# Patient Record
Sex: FEMALE | Race: OTHER | Employment: FULL TIME | URBAN - METROPOLITAN AREA
[De-identification: names, ages, dates, MRNs, and addresses within clinical notes are randomized per-mention and may not be internally consistent; named-entity substitution may affect disease eponyms.]

---

## 2019-01-25 ENCOUNTER — OFFICE VISIT (OUTPATIENT)
Dept: ENT CLINIC | Age: 49
End: 2019-01-25
Payer: COMMERCIAL

## 2019-01-25 VITALS — OXYGEN SATURATION: 98 % | SYSTOLIC BLOOD PRESSURE: 126 MMHG | DIASTOLIC BLOOD PRESSURE: 82 MMHG | HEART RATE: 67 BPM

## 2019-01-25 DIAGNOSIS — J30.9 ALLERGIC SINUSITIS: Primary | ICD-10-CM

## 2019-01-25 PROCEDURE — 99203 OFFICE O/P NEW LOW 30 MIN: CPT | Performed by: OTOLARYNGOLOGY

## 2019-01-25 PROCEDURE — 96372 THER/PROPH/DIAG INJ SC/IM: CPT | Performed by: OTOLARYNGOLOGY

## 2019-01-25 PROCEDURE — 1036F TOBACCO NON-USER: CPT | Performed by: OTOLARYNGOLOGY

## 2019-01-25 RX ORDER — METHYLPREDNISOLONE ACETATE 40 MG/ML
40 INJECTION, SUSPENSION INTRA-ARTICULAR; INTRALESIONAL; INTRAMUSCULAR; SOFT TISSUE ONCE
Status: COMPLETED | OUTPATIENT
Start: 2019-01-25 | End: 2019-01-25

## 2019-01-25 RX ORDER — MONTELUKAST SODIUM 10 MG/1
10 TABLET ORAL NIGHTLY
Qty: 30 TABLET | Refills: 1 | Status: SHIPPED | OUTPATIENT
Start: 2019-01-25 | End: 2019-04-01 | Stop reason: SDUPTHER

## 2019-01-25 RX ADMIN — METHYLPREDNISOLONE ACETATE 40 MG: 40 INJECTION, SUSPENSION INTRA-ARTICULAR; INTRALESIONAL; INTRAMUSCULAR; SOFT TISSUE at 12:08

## 2019-01-25 ASSESSMENT — ENCOUNTER SYMPTOMS
SINUS PAIN: 1
RHINORRHEA: 1
SINUS PRESSURE: 1
FACIAL SWELLING: 0
ALLERGIC/IMMUNOLOGIC NEGATIVE: 1
SORE THROAT: 0
VOICE CHANGE: 0
EYES NEGATIVE: 1
RESPIRATORY NEGATIVE: 1
TROUBLE SWALLOWING: 0

## 2019-04-01 DIAGNOSIS — J30.9 ALLERGIC SINUSITIS: ICD-10-CM

## 2019-04-01 RX ORDER — MONTELUKAST SODIUM 10 MG/1
TABLET ORAL
Qty: 30 TABLET | Refills: 0 | Status: SHIPPED | OUTPATIENT
Start: 2019-04-01 | End: 2019-04-29

## 2019-04-29 ENCOUNTER — OFFICE VISIT (OUTPATIENT)
Dept: INTERNAL MEDICINE CLINIC | Age: 49
End: 2019-04-29
Payer: COMMERCIAL

## 2019-04-29 VITALS
OXYGEN SATURATION: 98 % | DIASTOLIC BLOOD PRESSURE: 71 MMHG | WEIGHT: 167 LBS | BODY MASS INDEX: 26.21 KG/M2 | RESPIRATION RATE: 20 BRPM | HEIGHT: 67 IN | HEART RATE: 64 BPM | SYSTOLIC BLOOD PRESSURE: 104 MMHG | TEMPERATURE: 98.3 F

## 2019-04-29 DIAGNOSIS — J30.2 SEASONAL ALLERGIES: ICD-10-CM

## 2019-04-29 DIAGNOSIS — Z76.89 ESTABLISHING CARE WITH NEW DOCTOR, ENCOUNTER FOR: ICD-10-CM

## 2019-04-29 DIAGNOSIS — Z76.89 ESTABLISHING CARE WITH NEW DOCTOR, ENCOUNTER FOR: Primary | ICD-10-CM

## 2019-04-29 LAB
A/G RATIO: 1.5 (ref 1.1–2.2)
ALBUMIN SERPL-MCNC: 4.6 G/DL (ref 3.4–5)
ALP BLD-CCNC: 54 U/L (ref 40–129)
ALT SERPL-CCNC: 15 U/L (ref 10–40)
ANION GAP SERPL CALCULATED.3IONS-SCNC: 13 MMOL/L (ref 3–16)
AST SERPL-CCNC: 21 U/L (ref 15–37)
BASOPHILS ABSOLUTE: 0 K/UL (ref 0–0.2)
BASOPHILS RELATIVE PERCENT: 1.1 %
BILIRUB SERPL-MCNC: <0.2 MG/DL (ref 0–1)
BUN BLDV-MCNC: 14 MG/DL (ref 7–20)
CALCIUM SERPL-MCNC: 9.6 MG/DL (ref 8.3–10.6)
CHLORIDE BLD-SCNC: 102 MMOL/L (ref 99–110)
CHOLESTEROL, FASTING: 180 MG/DL (ref 0–199)
CO2: 26 MMOL/L (ref 21–32)
CREAT SERPL-MCNC: 0.6 MG/DL (ref 0.6–1.1)
EOSINOPHILS ABSOLUTE: 0 K/UL (ref 0–0.6)
EOSINOPHILS RELATIVE PERCENT: 1 %
GFR AFRICAN AMERICAN: >60
GFR NON-AFRICAN AMERICAN: >60
GLOBULIN: 3.1 G/DL
GLUCOSE BLD-MCNC: 99 MG/DL (ref 70–99)
HCT VFR BLD CALC: 40.5 % (ref 36–48)
HDLC SERPL-MCNC: 88 MG/DL (ref 40–60)
HEMOGLOBIN: 13.2 G/DL (ref 12–16)
LDL CHOLESTEROL CALCULATED: 85 MG/DL
LYMPHOCYTES ABSOLUTE: 1.5 K/UL (ref 1–5.1)
LYMPHOCYTES RELATIVE PERCENT: 41.4 %
MCH RBC QN AUTO: 30.7 PG (ref 26–34)
MCHC RBC AUTO-ENTMCNC: 32.5 G/DL (ref 31–36)
MCV RBC AUTO: 94.6 FL (ref 80–100)
MONOCYTES ABSOLUTE: 0.3 K/UL (ref 0–1.3)
MONOCYTES RELATIVE PERCENT: 9 %
NEUTROPHILS ABSOLUTE: 1.7 K/UL (ref 1.7–7.7)
NEUTROPHILS RELATIVE PERCENT: 47.5 %
PDW BLD-RTO: 13.3 % (ref 12.4–15.4)
PLATELET # BLD: 219 K/UL (ref 135–450)
PMV BLD AUTO: 9.1 FL (ref 5–10.5)
POTASSIUM SERPL-SCNC: 4.4 MMOL/L (ref 3.5–5.1)
RBC # BLD: 4.29 M/UL (ref 4–5.2)
SODIUM BLD-SCNC: 141 MMOL/L (ref 136–145)
TOTAL PROTEIN: 7.7 G/DL (ref 6.4–8.2)
TRIGLYCERIDE, FASTING: 37 MG/DL (ref 0–150)
VLDLC SERPL CALC-MCNC: 7 MG/DL
WBC # BLD: 3.6 K/UL (ref 4–11)

## 2019-04-29 PROCEDURE — 99213 OFFICE O/P EST LOW 20 MIN: CPT | Performed by: INTERNAL MEDICINE

## 2019-04-29 NOTE — PROGRESS NOTES
[address] [facility]       NURSING PROGRESS NOTE      April 29, 2019  [facility]    Chief Complaint:   Chief Complaint   Patient presents with    Establish Care       Constitutional: negative  Eyes: positive for contacts/glasses  Ears, nose, mouth, throat, and face: Gets thrush in mouth  Respiratory: negative, sinus issues  Cardiovascular: negative  Gastrointestinal: negative, lost 110 lbs over 3 years  Genitourinary: negative  Integument/breast: negative  Hematologic/lymphatic: negative  Musculoskeletal: negative  Neurological: negative  Diabetes: No    Pain Assessment:  Pain Present: no      Mobility/Safety/Self-Care:  Steady Gait: Yes  History of Falls: No   History of a Fall within the last 30 days No  Assistive Device: None  Poor Hygiene: No    Psychosocial:   Depression: No  If YES,    Does Patient express current thoughts of harming self or others?: No  Anxious: Yes  Insomnia: No  Inappropriate Behavior: No  Alcohol Abuse: y.  Stopped 7 years ago  Substance Abuse: no  Signs of Physical Abuse: No  Signs of Emotional Abuse: No    Educational:  Identify the learner who is being assessed for education:  patient                    Ability to Learn:  Exhibits ability to grasp concepts and respond to questions: Medium  Ready to Learn: Yes  calm   Preferred Method of Learning:  written  Barriers to Learning: Verbalizes interest  Special Considerations due to cultural, Gnosticism, spiritual beliefs:  No  Language:  English  :  No    Note:   Had complete hysterectomy 5 years ago for fibroid      9:31 AM 4/29/2019

## 2019-04-29 NOTE — PROGRESS NOTES
OUTPATIENT PROGRESS NOTE    Date of Service:  4/29/2019  Address: [address] [place] [address] [address] [place] [id number]  Dept: [phone]  Loc: [phone]    Subjective:      Patient [id number]  Addi Liao is a 52 y.o. female with:  Chief Complaint   Patient presents with    Establish Care       HPI: 51 yo female with a history of ETOH abuse, sober for 7 years, being seen to establish care. She reports chronic upper respiratory symptoms that has been treated over the years with courses of antibiotics and steroids, with little persistent relief. No cough, no shortness of breath, no chest pain, no unexplained weight loss, no fevers or chills, no night sweats. She has recurrent episodes of what she is told is thrush, has had them since she was a child. Recently diagnosed with this again by an urgent care. She is treating it with diet modifications she read about online. She is not sexually active, has not been so for about 10 years. She has one child. Family history of HTN, DM2. Her mother is a 27 year survivor of breast and uterine cancer. Patient had a total hysterectomy about 5 years ago. No vaginal bleeding; occasional vasomotor symptoms. No blood per rectum. She has not had a colonoscopy nor has she had a mammogram.    Review of Systems  As per HPI otherwise negative on 12 point review    Objective: Wt Readings from Last 3 Encounters:   04/29/19 167 lb (75.8 kg)     BP Readings from Last 3 Encounters:   04/29/19 104/71   01/25/19 126/82      Vitals:    04/29/19 0929   BP: 104/71   Site: Right Upper Arm   Position: Sitting   Cuff Size: Medium Adult   Pulse: 64   Resp: 20   Temp: 98.3 °F (36.8 °C)   SpO2: 98%   Weight: 167 lb (75.8 kg)   Height: 5' 7\" (1.702 m)     Body mass index is 26.16 kg/m². Physical Exam   Constitutional: She is oriented to person, place, and time. She appears well-developed and well-nourished. No distress.    HENT: Head: Normocephalic and atraumatic. Mouth/Throat: Oropharynx is clear and moist.   Eyes: EOM are normal. No scleral icterus. Neck: Normal range of motion. Neck supple. No JVD present. No tracheal deviation present. No thyromegaly present. Cardiovascular: Normal rate and regular rhythm. Pulmonary/Chest: Effort normal and breath sounds normal.   Abdominal: Soft. Bowel sounds are normal. She exhibits no distension. There is no tenderness. Musculoskeletal: Normal range of motion. She exhibits no edema. Lymphadenopathy:     She has no cervical adenopathy. Neurological: She is alert and oriented to person, place, and time. Grossly nonfocal   Skin: Skin is warm and dry. Capillary refill takes less than 2 seconds. She is not diaphoretic. No erythema. Psychiatric: She has a normal mood and affect. Her behavior is normal. Judgment and thought content normal.   Vitals reviewed. Assessment/Plan:      Encounter Diagnoses   Name Primary?  Establishing care with new doctor, encounter for Yes    Seasonal allergies        1. Establishing care with new doctor, encounter for  - CBC WITH AUTO DIFFERENTIAL; Future  - Lipid, Fasting; Future  - COMPREHENSIVE METABOLIC PANEL; Future  - TSH with Reflex; Future  - HEMOGLOBIN A1C; Future  - discussed need for mammograms and colonoscopy screening exams in the near future  - discussed recommendation of one time HIV screening test, as patient is low risk for HIV - she does not want to pursue this testing at this time    2.  Seasonal allergies  -encouraged to try over the counter fexonfenadine; risks benefits alternatives discussed       Staffed with Dr. Vivi Burns    Additional Orders:      Orders Placed This Encounter   Procedures    CBC WITH AUTO DIFFERENTIAL     Standing Status:   Future     Standing Expiration Date:   4/29/2020    Lipid, Fasting     Standing Status:   Future     Standing Expiration Date:   4/29/2020    COMPREHENSIVE METABOLIC PANEL     Standing Status:   Future     Standing Expiration Date:   4/29/2020    TSH with Reflex     Standing Status:   Future     Standing Expiration Date:   4/29/2020    HEMOGLOBIN A1C     Standing Status:   Future     Standing Expiration Date:   4/29/2020     No orders of the defined types were placed in this encounter. DISPOSITION:      1. Return in about 3 months (around 7/29/2019).        Jordon Camacho MD   pgy 3

## 2019-04-29 NOTE — PATIENT INSTRUCTIONS
Before any of you medication is completely gone, call your pharmacy AT LEAST 1 WEEK ahead for refills. Review all information regarding your medication before starting. If you become ill when the clinic is closed, please call the [facility] [employer] [facility]  at   #[phone] and ask the  to page the AOD between 6:00 AM and 6:00 PM or page the AON between 6:00 PM and 6:00 am    The clinic is not able to process MY CHART requests for appointments or messages including requests. Please call the [address] [place] [address] at [phone]  For appointments and messages. Call your pharmacy for medication refills. Return to clinic 3 months.   Call after  July 1st for appointment  Labwork ordered to be done anytime  Allegra over the counter to take daily for seasonal allergies    Continue medication as listed on discharge sheet    Instructions reviewed before discharge and copy given to patient    Tommie Una Castellano [phone]

## 2019-04-30 LAB
ESTIMATED AVERAGE GLUCOSE: 125.5 MG/DL
HBA1C MFR BLD: 6 %
TSH REFLEX: 1.11 UIU/ML (ref 0.27–4.2)

## 2019-05-06 DIAGNOSIS — J30.9 ALLERGIC SINUSITIS: ICD-10-CM

## 2019-05-06 RX ORDER — MONTELUKAST SODIUM 10 MG/1
TABLET ORAL
Qty: 30 TABLET | Refills: 0 | Status: SHIPPED | OUTPATIENT
Start: 2019-05-06 | End: 2019-06-09 | Stop reason: SDUPTHER

## 2019-06-09 DIAGNOSIS — J30.9 ALLERGIC SINUSITIS: ICD-10-CM

## 2019-06-10 RX ORDER — MONTELUKAST SODIUM 10 MG/1
TABLET ORAL
Qty: 30 TABLET | Refills: 0 | Status: SHIPPED | OUTPATIENT
Start: 2019-06-10 | End: 2019-07-15 | Stop reason: SDUPTHER

## 2019-07-14 DIAGNOSIS — J30.9 ALLERGIC SINUSITIS: ICD-10-CM

## 2019-07-15 RX ORDER — MONTELUKAST SODIUM 10 MG/1
TABLET ORAL
Qty: 30 TABLET | Refills: 0 | Status: SHIPPED | OUTPATIENT
Start: 2019-07-15 | End: 2019-08-17 | Stop reason: SDUPTHER

## 2019-08-17 DIAGNOSIS — J30.9 ALLERGIC SINUSITIS: ICD-10-CM

## 2019-08-19 RX ORDER — MONTELUKAST SODIUM 10 MG/1
TABLET ORAL
Qty: 30 TABLET | Refills: 0 | Status: SHIPPED | OUTPATIENT
Start: 2019-08-19 | End: 2019-09-20 | Stop reason: SDUPTHER

## 2019-09-20 DIAGNOSIS — J30.9 ALLERGIC SINUSITIS: ICD-10-CM

## 2019-09-20 RX ORDER — MONTELUKAST SODIUM 10 MG/1
TABLET ORAL
Qty: 30 TABLET | Refills: 0 | Status: SHIPPED | OUTPATIENT
Start: 2019-09-20 | End: 2019-10-30 | Stop reason: SDUPTHER

## 2019-10-22 ENCOUNTER — OFFICE VISIT (OUTPATIENT)
Dept: INTERNAL MEDICINE CLINIC | Age: 49
End: 2019-10-22
Payer: COMMERCIAL

## 2019-10-22 VITALS
HEIGHT: 67 IN | BODY MASS INDEX: 27.31 KG/M2 | WEIGHT: 174 LBS | SYSTOLIC BLOOD PRESSURE: 126 MMHG | TEMPERATURE: 98.8 F | OXYGEN SATURATION: 98 % | DIASTOLIC BLOOD PRESSURE: 84 MMHG | HEART RATE: 59 BPM

## 2019-10-22 DIAGNOSIS — F10.11 HISTORY OF ALCOHOL ABUSE: ICD-10-CM

## 2019-10-22 DIAGNOSIS — J30.9 ALLERGIC SINUSITIS: ICD-10-CM

## 2019-10-22 DIAGNOSIS — R73.03 PREDIABETES: Primary | ICD-10-CM

## 2019-10-22 PROCEDURE — 99213 OFFICE O/P EST LOW 20 MIN: CPT | Performed by: STUDENT IN AN ORGANIZED HEALTH CARE EDUCATION/TRAINING PROGRAM

## 2019-10-22 ASSESSMENT — PATIENT HEALTH QUESTIONNAIRE - PHQ9
SUM OF ALL RESPONSES TO PHQ9 QUESTIONS 1 & 2: 0
SUM OF ALL RESPONSES TO PHQ QUESTIONS 1-9: 0
2. FEELING DOWN, DEPRESSED OR HOPELESS: 0
1. LITTLE INTEREST OR PLEASURE IN DOING THINGS: 0
SUM OF ALL RESPONSES TO PHQ QUESTIONS 1-9: 0

## 2019-10-22 ASSESSMENT — ENCOUNTER SYMPTOMS
SHORTNESS OF BREATH: 0
VOICE CHANGE: 0
CHEST TIGHTNESS: 0
CONSTIPATION: 0
ABDOMINAL PAIN: 0
DIARRHEA: 0
RHINORRHEA: 0

## 2019-10-30 DIAGNOSIS — J30.9 ALLERGIC SINUSITIS: ICD-10-CM

## 2019-10-30 RX ORDER — MONTELUKAST SODIUM 10 MG/1
TABLET ORAL
Qty: 30 TABLET | Refills: 0 | Status: SHIPPED | OUTPATIENT
Start: 2019-10-30 | End: 2019-12-04 | Stop reason: SDUPTHER

## 2019-12-04 DIAGNOSIS — J30.9 ALLERGIC SINUSITIS: ICD-10-CM

## 2019-12-04 RX ORDER — MONTELUKAST SODIUM 10 MG/1
TABLET ORAL
Qty: 30 TABLET | Refills: 0 | Status: SHIPPED | OUTPATIENT
Start: 2019-12-04 | End: 2020-01-10

## 2020-01-10 RX ORDER — MONTELUKAST SODIUM 10 MG/1
TABLET ORAL
Qty: 30 TABLET | Refills: 0 | Status: SHIPPED | OUTPATIENT
Start: 2020-01-10 | End: 2020-02-10

## 2020-02-10 RX ORDER — MONTELUKAST SODIUM 10 MG/1
TABLET ORAL
Qty: 30 TABLET | Refills: 0 | Status: SHIPPED | OUTPATIENT
Start: 2020-02-10

## 2020-07-18 ENCOUNTER — HOSPITAL ENCOUNTER (EMERGENCY)
Age: 50
Discharge: HOME | End: 2020-07-18
Attending: EMERGENCY MEDICINE

## 2020-07-18 VITALS
BODY MASS INDEX: 25.84 KG/M2 | OXYGEN SATURATION: 99 % | WEIGHT: 165 LBS | DIASTOLIC BLOOD PRESSURE: 75 MMHG | SYSTOLIC BLOOD PRESSURE: 122 MMHG | RESPIRATION RATE: 15 BRPM | TEMPERATURE: 98.4 F | HEART RATE: 62 BPM

## 2020-07-18 PROCEDURE — 99283 EMERGENCY DEPT VISIT LOW MDM: CPT

## 2020-07-18 PROCEDURE — 6370000000 HC RX 637 (ALT 250 FOR IP): Performed by: EMERGENCY MEDICINE

## 2020-07-18 RX ORDER — IBUPROFEN 600 MG/1
600 TABLET ORAL ONCE
Status: COMPLETED | OUTPATIENT
Start: 2020-07-18 | End: 2020-07-18

## 2020-07-18 RX ADMIN — IBUPROFEN 600 MG: 600 TABLET ORAL at 14:09

## 2020-07-18 ASSESSMENT — PAIN DESCRIPTION - ORIENTATION: ORIENTATION: MID

## 2020-07-18 ASSESSMENT — PAIN DESCRIPTION - LOCATION: LOCATION: BACK

## 2020-07-18 ASSESSMENT — PAIN DESCRIPTION - PAIN TYPE: TYPE: ACUTE PAIN

## 2020-07-18 ASSESSMENT — PAIN SCALES - GENERAL: PAINLEVEL_OUTOF10: 4
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
## 2020-10-02 ENCOUNTER — OFFICE VISIT (OUTPATIENT)
Dept: INTERNAL MEDICINE CLINIC | Age: 50
End: 2020-10-02
Payer: COMMERCIAL

## 2020-10-02 VITALS
WEIGHT: 177.6 LBS | DIASTOLIC BLOOD PRESSURE: 70 MMHG | OXYGEN SATURATION: 99 % | TEMPERATURE: 96.6 F | BODY MASS INDEX: 27.82 KG/M2 | RESPIRATION RATE: 16 BRPM | SYSTOLIC BLOOD PRESSURE: 110 MMHG | HEART RATE: 77 BPM

## 2020-10-02 DIAGNOSIS — R73.03 PRE-DIABETES: ICD-10-CM

## 2020-10-02 PROCEDURE — 99213 OFFICE O/P EST LOW 20 MIN: CPT | Performed by: STUDENT IN AN ORGANIZED HEALTH CARE EDUCATION/TRAINING PROGRAM

## 2020-10-02 ASSESSMENT — ENCOUNTER SYMPTOMS
EYE DISCHARGE: 0
CHOKING: 0
BACK PAIN: 0
EYE REDNESS: 0
ANAL BLEEDING: 0
BLOOD IN STOOL: 0
CHEST TIGHTNESS: 0
EYE ITCHING: 0
COUGH: 0
PHOTOPHOBIA: 0
APNEA: 0
SHORTNESS OF BREATH: 0
CONSTIPATION: 0
ABDOMINAL DISTENTION: 0
ABDOMINAL PAIN: 0
EYE PAIN: 0

## 2020-10-02 ASSESSMENT — PATIENT HEALTH QUESTIONNAIRE - PHQ9
SUM OF ALL RESPONSES TO PHQ9 QUESTIONS 1 & 2: 0
1. LITTLE INTEREST OR PLEASURE IN DOING THINGS: 0
2. FEELING DOWN, DEPRESSED OR HOPELESS: 0
SUM OF ALL RESPONSES TO PHQ QUESTIONS 1-9: 0
SUM OF ALL RESPONSES TO PHQ QUESTIONS 1-9: 0

## 2020-10-02 NOTE — PROGRESS NOTES
10/2/2020     Alisia Boeck (:  1970) is a 48 y.o. female, here for evaluation of the following medical concerns:    HPI  Follow up visit:  Pt denies any Complains, she was asking for flu shot, Sh is felling well, she does want more time to think and need some time,     Pt had hx of alcohol abuse, she is Sober more than 5 Years. Review of Systems   Constitutional: Negative for activity change, appetite change, chills, diaphoresis and fatigue. HENT: Negative for congestion, dental problem, drooling and ear discharge. Eyes: Negative for photophobia, pain, discharge, redness and itching. Respiratory: Negative for apnea, cough, choking, chest tightness and shortness of breath. Cardiovascular: Negative for chest pain, palpitations and leg swelling. Gastrointestinal: Negative for abdominal distention, abdominal pain, anal bleeding, blood in stool and constipation. Endocrine: Negative for cold intolerance, heat intolerance, polydipsia, polyphagia and polyuria. Genitourinary: Negative for difficulty urinating, dyspareunia, dysuria, enuresis, flank pain and frequency. Musculoskeletal: Negative for arthralgias, back pain, gait problem, joint swelling and myalgias. Neurological: Negative for dizziness, seizures, facial asymmetry, speech difficulty, light-headedness, numbness and headaches. Prior to Visit Medications    Medication Sig Taking?  Authorizing Provider   montelukast (SINGULAIR) 10 MG tablet TAKE 1 TABLET BY MOUTH EVERY NIGHT  Patient not taking: Reported on 10/2/2020  Madalyn Churchill MD        Social History     Tobacco Use    Smoking status: Never Smoker    Smokeless tobacco: Never Used   Substance Use Topics    Alcohol use: Not Currently        Vitals:    10/02/20 1029   BP: 110/70   Site: Right Upper Arm   Position: Sitting   Cuff Size: Medium Adult   Pulse: 77   Resp: 16   Temp: 96.6 °F (35.9 °C)   TempSrc: Oral   SpO2: 99%   Weight: 177 lb 9.6 oz (80.6 kg) Estimated body mass index is 27.82 kg/m² as calculated from the following:    Height as of 10/22/19: 5' 7\" (1.702 m). Weight as of this encounter: 177 lb 9.6 oz (80.6 kg). Physical Exam  Constitutional:       General: She is not in acute distress. Appearance: She is not diaphoretic. HENT:      Head: Normocephalic. Right Ear: Tympanic membrane normal. There is no impacted cerumen. Left Ear: Ear canal normal. There is no impacted cerumen. Nose: Nose normal. No congestion or rhinorrhea. Mouth/Throat:      Mouth: Mucous membranes are moist.      Pharynx: No oropharyngeal exudate or posterior oropharyngeal erythema. Eyes:      General:         Right eye: No discharge. Left eye: No discharge. Pupils: Pupils are equal, round, and reactive to light. Neck:      Musculoskeletal: Normal range of motion. No neck rigidity. Vascular: No carotid bruit. Cardiovascular:      Rate and Rhythm: Normal rate and regular rhythm. Heart sounds: No murmur. No friction rub. Pulmonary:      Effort: Pulmonary effort is normal. No respiratory distress. Breath sounds: No rhonchi. Abdominal:      General: Abdomen is flat. There is no distension. Hernia: No hernia is present. Musculoskeletal: Normal range of motion. General: No swelling or signs of injury. Skin:     General: Skin is warm. Capillary Refill: Capillary refill takes less than 2 seconds. Coloration: Skin is not jaundiced. Findings: No erythema. Neurological:      General: No focal deficit present. Mental Status: She is alert. Cranial Nerves: No cranial nerve deficit. Coordination: Coordination normal.      Gait: Gait normal.   Psychiatric:         Mood and Affect: Mood normal.         ASSESSMENT/PLAN:  1. Pre-diabetes  - BASIC METABOLIC PANEL; Future  - CBC Auto Differential; Future  - HEMOGLOBIN A1C; Future      Return in about 6 months (around 4/2/2021).     An

## 2020-10-03 LAB
ANION GAP SERPL CALCULATED.3IONS-SCNC: 14 MMOL/L (ref 3–16)
BUN BLDV-MCNC: 11 MG/DL (ref 7–20)
CALCIUM SERPL-MCNC: 10 MG/DL (ref 8.3–10.6)
CHLORIDE BLD-SCNC: 103 MMOL/L (ref 99–110)
CO2: 23 MMOL/L (ref 21–32)
CREAT SERPL-MCNC: 0.6 MG/DL (ref 0.6–1.1)
GFR AFRICAN AMERICAN: >60
GFR NON-AFRICAN AMERICAN: >60
GLUCOSE BLD-MCNC: 95 MG/DL (ref 70–99)
POTASSIUM SERPL-SCNC: 4.4 MMOL/L (ref 3.5–5.1)
REASON FOR REJECTION: NORMAL
REJECTED TEST: NORMAL
SODIUM BLD-SCNC: 140 MMOL/L (ref 136–145)

## 2020-10-05 ENCOUNTER — TELEPHONE (OUTPATIENT)
Dept: INTERNAL MEDICINE CLINIC | Age: 50
End: 2020-10-05

## 2020-10-05 NOTE — TELEPHONE ENCOUNTER
Lab called states cbc and a1c not done due to clotting. Dr. Johnie Barragan notified, states can be done next visit.